# Patient Record
Sex: MALE | Race: WHITE | NOT HISPANIC OR LATINO | URBAN - METROPOLITAN AREA
[De-identification: names, ages, dates, MRNs, and addresses within clinical notes are randomized per-mention and may not be internally consistent; named-entity substitution may affect disease eponyms.]

---

## 2017-03-20 ENCOUNTER — IMPORTED ENCOUNTER (OUTPATIENT)
Dept: URBAN - METROPOLITAN AREA CLINIC 50 | Facility: CLINIC | Age: 72
End: 2017-03-20

## 2018-03-26 ENCOUNTER — IMPORTED ENCOUNTER (OUTPATIENT)
Dept: URBAN - METROPOLITAN AREA CLINIC 50 | Facility: CLINIC | Age: 73
End: 2018-03-26

## 2018-09-24 ENCOUNTER — IMPORTED ENCOUNTER (OUTPATIENT)
Dept: URBAN - METROPOLITAN AREA CLINIC 50 | Facility: CLINIC | Age: 73
End: 2018-09-24

## 2018-09-24 NOTE — PATIENT DISCUSSION
"""Dr. Lucio Michele to review test results with patient."" ""Based on increased c/d ratio and/or ocular hypertension

## 2019-03-25 ENCOUNTER — IMPORTED ENCOUNTER (OUTPATIENT)
Dept: URBAN - METROPOLITAN AREA CLINIC 50 | Facility: CLINIC | Age: 74
End: 2019-03-25

## 2019-08-12 ENCOUNTER — IMPORTED ENCOUNTER (OUTPATIENT)
Dept: URBAN - METROPOLITAN AREA CLINIC 50 | Facility: CLINIC | Age: 74
End: 2019-08-12

## 2019-11-04 ENCOUNTER — IMPORTED ENCOUNTER (OUTPATIENT)
Dept: URBAN - METROPOLITAN AREA CLINIC 50 | Facility: CLINIC | Age: 74
End: 2019-11-04

## 2020-05-04 ENCOUNTER — IMPORTED ENCOUNTER (OUTPATIENT)
Dept: URBAN - METROPOLITAN AREA CLINIC 50 | Facility: CLINIC | Age: 75
End: 2020-05-04

## 2021-04-18 ASSESSMENT — TONOMETRY
OD_IOP_MMHG: 12
OD_IOP_MMHG: 11
OD_IOP_MMHG: 12
OS_IOP_MMHG: 16
OS_IOP_MMHG: 15
OS_IOP_MMHG: 14
OD_IOP_MMHG: 10
OD_IOP_MMHG: 14
OS_IOP_MMHG: 14
OS_IOP_MMHG: 15
OD_IOP_MMHG: 13
OD_IOP_MMHG: 15
OS_IOP_MMHG: 14
OS_IOP_MMHG: 14
OD_IOP_MMHG: 10
OS_IOP_MMHG: 13
OS_IOP_MMHG: 13
OS_IOP_MMHG: 12
OD_IOP_MMHG: 14
OD_IOP_MMHG: 13

## 2021-04-18 ASSESSMENT — VISUAL ACUITY
OD_OTHER: 20/100. >20/400.
OS_PH: 20/25-2
OD_CC: J1+
OD_CC: 20/40-1
OS_CC: J1+
OS_CC: 20/25+2
OS_CC: J1+
OS_CC: 20/20=
OS_BAT: 20/30-
OD_BAT: 20/70-
OS_BAT: 20/50
OD_CC: 20/40-2
OS_OTHER: 20/50. 20/70.
OD_CC: J1+
OD_BAT: 20/400-
OS_CC: 20/20
OD_CC: 20/50
OS_BAT: 20/40
OS_CC: 20/25+2
OD_BAT: 20/100
OD_OTHER: 20/70-. 20/200.
OS_OTHER: 20/30-. 20/80-.
OD_CC: 20/40-2
OS_CC: J1+@ 15 IN
OS_CC: 20/30+2
OD_CC: 20/40
OS_OTHER: 20/40. 20/50.
OD_CC: 20/40=
OD_PH: 20/40
OS_CC: 20/25-
OD_CC: J1+@ 15 IN
OD_OTHER: 20/400-.

## 2021-04-18 ASSESSMENT — PACHYMETRY
OS_CT_UM: 568
OD_CT_UM: 576
OS_CT_UM: 568
OD_CT_UM: 576
OS_CT_UM: 568

## 2021-04-30 ENCOUNTER — PREPPED CHART (OUTPATIENT)
Dept: URBAN - METROPOLITAN AREA CLINIC 52 | Facility: CLINIC | Age: 76
End: 2021-04-30

## 2021-04-30 ASSESSMENT — VISUAL ACUITY
OS_CC: 20/30
OS_GLARE: 20/50
OS_GLARE: 20/40
OD_CC: 20/40
OU_CC: J1+
OU_CC: 20/30
OD_GLARE: 20/400

## 2021-04-30 ASSESSMENT — TONOMETRY
OD_IOP_MMHG: 13
OS_IOP_MMHG: 15
OD_IOP_MMHG: 15
OS_IOP_MMHG: 14

## 2021-05-03 ENCOUNTER — ROUTINE EXAM (OUTPATIENT)
Dept: URBAN - METROPOLITAN AREA CLINIC 52 | Facility: CLINIC | Age: 76
End: 2021-05-03

## 2021-05-03 DIAGNOSIS — Z01.01: ICD-10-CM

## 2021-05-03 DIAGNOSIS — H40.013: ICD-10-CM

## 2021-05-03 DIAGNOSIS — H43.813: ICD-10-CM

## 2021-05-03 PROCEDURE — 92134 CPTRZ OPH DX IMG PST SGM RTA: CPT

## 2021-05-03 PROCEDURE — 76514 ECHO EXAM OF EYE THICKNESS: CPT

## 2021-05-03 PROCEDURE — 92014 COMPRE OPH EXAM EST PT 1/>: CPT

## 2021-05-03 ASSESSMENT — TONOMETRY
OS_IOP_MMHG: 15
OD_IOP_MMHG: 12
OS_IOP_MMHG: 14
OD_IOP_MMHG: 10

## 2021-05-03 ASSESSMENT — VISUAL ACUITY
OU_CC: 20/20
OS_GLARE: 20/30
OS_GLARE: 20/30-2
OD_GLARE: 20/40
OD_CC: 20/40+2
OS_CC: 20/20-1
OD_GLARE: 20/40
OU_CC: J1+

## 2021-05-03 ASSESSMENT — PACHYMETRY
OD_CT_UM: 576
OS_CT_UM: 568

## 2021-05-03 NOTE — PATIENT DISCUSSION
Increased c:d ratio OU. IOP stable OU. Schedule HVF/RNFL OCT. Will call patient with any abnormal findings.

## 2021-05-14 ENCOUNTER — DIAGNOSTICS ONLY (OUTPATIENT)
Dept: URBAN - METROPOLITAN AREA CLINIC 52 | Facility: CLINIC | Age: 76
End: 2021-05-14

## 2021-05-14 DIAGNOSIS — H40.013: ICD-10-CM

## 2021-05-14 PROCEDURE — 92083 EXTENDED VISUAL FIELD XM: CPT

## 2021-05-14 PROCEDURE — 92133 CPTRZD OPH DX IMG PST SGM ON: CPT

## 2021-05-14 NOTE — PATIENT DISCUSSION
GLAUCOMA SUSPECT-C/D: The patient is a glaucoma suspect because of suspicious appearance of the optic disc(s).  Periodic follow-ups with visual field testing, optic nerve imaging and nerve fiber studies are essential.

## 2022-06-13 ENCOUNTER — ESTABLISHED PATIENT (OUTPATIENT)
Dept: URBAN - METROPOLITAN AREA CLINIC 52 | Facility: CLINIC | Age: 77
End: 2022-06-13

## 2022-06-13 DIAGNOSIS — H25.813: ICD-10-CM

## 2022-06-13 DIAGNOSIS — H40.013: ICD-10-CM

## 2022-06-13 DIAGNOSIS — Z01.01: ICD-10-CM

## 2022-06-13 PROCEDURE — 92014 COMPRE OPH EXAM EST PT 1/>: CPT

## 2022-06-13 PROCEDURE — 92015 DETERMINE REFRACTIVE STATE: CPT

## 2022-06-13 ASSESSMENT — VISUAL ACUITY
OD_GLARE: 20/40
OS_GLARE: 20/25
OS_GLARE: 20/25
OS_PH: 20/25-2
OU_CC: 20/25-1
OD_CC: 20/40-2
OD_GLARE: 20/60
OS_CC: 20/30+2

## 2022-06-13 ASSESSMENT — TONOMETRY
OS_IOP_MMHG: 14
OS_IOP_MMHG: 15
OD_IOP_MMHG: 12
OD_IOP_MMHG: 10

## 2024-09-25 ENCOUNTER — COMPREHENSIVE EXAM (OUTPATIENT)
Dept: URBAN - METROPOLITAN AREA CLINIC 49 | Facility: LOCATION | Age: 79
End: 2024-09-25

## 2024-09-25 DIAGNOSIS — H02.403: ICD-10-CM

## 2024-09-25 DIAGNOSIS — Z01.01: ICD-10-CM

## 2024-09-25 DIAGNOSIS — H52.4: ICD-10-CM

## 2024-09-25 DIAGNOSIS — H40.013: ICD-10-CM

## 2024-09-25 DIAGNOSIS — H25.813: ICD-10-CM

## 2024-09-25 PROCEDURE — 92014 COMPRE OPH EXAM EST PT 1/>: CPT

## 2024-09-25 PROCEDURE — 92015 DETERMINE REFRACTIVE STATE: CPT

## 2025-06-26 ENCOUNTER — COMPREHENSIVE EXAM (OUTPATIENT)
Age: 80
End: 2025-06-26

## 2025-06-26 DIAGNOSIS — H25.813: ICD-10-CM

## 2025-06-26 DIAGNOSIS — H52.4: ICD-10-CM

## 2025-06-26 DIAGNOSIS — H02.403: ICD-10-CM

## 2025-06-26 DIAGNOSIS — Z01.01: ICD-10-CM

## 2025-06-26 DIAGNOSIS — H40.013: ICD-10-CM

## 2025-06-26 PROCEDURE — 92014 COMPRE OPH EXAM EST PT 1/>: CPT

## 2025-06-26 PROCEDURE — 92015 DETERMINE REFRACTIVE STATE: CPT
